# Patient Record
Sex: FEMALE | Race: WHITE | Employment: UNEMPLOYED | URBAN - NONMETROPOLITAN AREA
[De-identification: names, ages, dates, MRNs, and addresses within clinical notes are randomized per-mention and may not be internally consistent; named-entity substitution may affect disease eponyms.]

---
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
## 2022-12-05 ENCOUNTER — OFFICE VISIT (OUTPATIENT)
Dept: FAMILY MEDICINE CLINIC | Age: 22
End: 2022-12-05
Payer: COMMERCIAL

## 2022-12-05 VITALS
HEART RATE: 82 BPM | SYSTOLIC BLOOD PRESSURE: 126 MMHG | HEIGHT: 72 IN | BODY MASS INDEX: 21.26 KG/M2 | WEIGHT: 157 LBS | DIASTOLIC BLOOD PRESSURE: 82 MMHG | OXYGEN SATURATION: 99 % | TEMPERATURE: 98.1 F

## 2022-12-05 DIAGNOSIS — H10.021 PINK EYE DISEASE OF RIGHT EYE: Primary | ICD-10-CM

## 2022-12-05 PROCEDURE — G8427 DOCREV CUR MEDS BY ELIG CLIN: HCPCS | Performed by: NURSE PRACTITIONER

## 2022-12-05 PROCEDURE — G8484 FLU IMMUNIZE NO ADMIN: HCPCS | Performed by: NURSE PRACTITIONER

## 2022-12-05 PROCEDURE — 4004F PT TOBACCO SCREEN RCVD TLK: CPT | Performed by: NURSE PRACTITIONER

## 2022-12-05 PROCEDURE — G8420 CALC BMI NORM PARAMETERS: HCPCS | Performed by: NURSE PRACTITIONER

## 2022-12-05 PROCEDURE — 99203 OFFICE O/P NEW LOW 30 MIN: CPT | Performed by: NURSE PRACTITIONER

## 2022-12-05 RX ORDER — POLYMYXIN B SULFATE AND TRIMETHOPRIM 1; 10000 MG/ML; [USP'U]/ML
1 SOLUTION OPHTHALMIC EVERY 6 HOURS
Qty: 10 ML | Refills: 0 | Status: SHIPPED | OUTPATIENT
Start: 2022-12-05 | End: 2022-12-15

## 2022-12-05 ASSESSMENT — ENCOUNTER SYMPTOMS
ABDOMINAL PAIN: 0
COUGH: 0
EYE REDNESS: 1
RHINORRHEA: 0
SHORTNESS OF BREATH: 0
RESPIRATORY NEGATIVE: 1
DOUBLE VISION: 0
VOMITING: 0
PHOTOPHOBIA: 0
NAUSEA: 0
SORE THROAT: 0
EYE DISCHARGE: 1
DIARRHEA: 0

## 2022-12-05 NOTE — PROGRESS NOTES
Rosa Benedict (:  2000) is a 23 y.o. female,New patient, here for evaluation of the following chief complaint(s): Conjunctivitis         ASSESSMENT/PLAN:  1. Pink eye disease of right eye  -     trimethoprim-polymyxin b (POLYTRIM) 77958-0.1 UNIT/ML-% ophthalmic solution; Place 1 drop into the right eye in the morning and 1 drop at noon and 1 drop in the evening and 1 drop before bedtime. Do all this for 10 days. , Disp-10 mL, R-0Normal    No follow-ups on file. Subjective   SUBJECTIVE/OBJECTIVE:  Conjunctivitis   The current episode started today. The onset was sudden. Associated symptoms include eye discharge and eye redness. Pertinent negatives include no decreased vision, no double vision, no photophobia, no abdominal pain, no diarrhea, no nausea, no vomiting, no congestion, no ear discharge, no rhinorrhea, no sore throat and no cough. Review of Systems   HENT:  Negative for congestion, ear discharge, rhinorrhea and sore throat. Eyes:  Positive for discharge and redness. Negative for double vision and photophobia. Respiratory: Negative. Negative for cough and shortness of breath. Gastrointestinal:  Negative for abdominal pain, diarrhea, nausea and vomiting. Objective   Physical Exam  Vitals reviewed. Constitutional:       Appearance: Normal appearance. She is not ill-appearing. HENT:      Head: Normocephalic and atraumatic. Right Ear: Tympanic membrane normal.      Left Ear: Tympanic membrane normal.      Mouth/Throat:      Mouth: Mucous membranes are moist.   Eyes:      General:         Right eye: Discharge present. Left eye: No discharge. Extraocular Movements: Extraocular movements intact. Comments: +discharge from right eye. Cardiovascular:      Rate and Rhythm: Regular rhythm. Heart sounds: Normal heart sounds. No murmur heard. No gallop. Pulmonary:      Effort: No respiratory distress. Breath sounds: Normal breath sounds. No wheezing. Musculoskeletal:      Cervical back: Normal range of motion and neck supple. Lymphadenopathy:      Cervical: No cervical adenopathy. Skin:     General: Skin is warm and dry. Neurological:      Mental Status: She is alert and oriented to person, place, and time. Psychiatric:         Thought Content: Thought content normal.         Judgment: Judgment normal.                An electronic signature was used to authenticate this note.     --Abdulkadir Taylor, APRFABRICIO - CNP

## 2022-12-05 NOTE — LETTER
[address] [address]  Phone: [phone]  Fax: [phone]    JUAN Garcia CNP        December 5, 2022     Patient: Anne Marie Vincent   YOB: 2000   Date of Visit: 12/5/2022       To Whom it May Concern:    Anne Marie Vincent was seen in my clinic on 12/5/2022. She may return to school on 12-6-2022 . If you have any questions or concerns, please don't hesitate to call.     Sincerely,         JUAN Garcia CNP

## 2023-07-14 ENCOUNTER — OFFICE VISIT (OUTPATIENT)
Dept: PRIMARY CARE CLINIC | Age: 23
End: 2023-07-14

## 2023-07-14 ENCOUNTER — HOSPITAL ENCOUNTER (OUTPATIENT)
Dept: GENERAL RADIOLOGY | Age: 23
Discharge: HOME | End: 2023-07-14
Payer: COMMERCIAL

## 2023-07-14 VITALS
HEART RATE: 62 BPM | HEIGHT: 72 IN | DIASTOLIC BLOOD PRESSURE: 80 MMHG | OXYGEN SATURATION: 100 % | SYSTOLIC BLOOD PRESSURE: 104 MMHG | TEMPERATURE: 98.4 F | BODY MASS INDEX: 22.48 KG/M2 | RESPIRATION RATE: 15 BRPM | WEIGHT: 166 LBS

## 2023-07-14 DIAGNOSIS — M25.572 ACUTE LEFT ANKLE PAIN: ICD-10-CM

## 2023-07-14 DIAGNOSIS — M25.572 ACUTE LEFT ANKLE PAIN: Primary | ICD-10-CM

## 2023-07-14 PROCEDURE — 73610 X-RAY EXAM OF ANKLE: CPT

## 2023-07-14 SDOH — ECONOMIC STABILITY: FOOD INSECURITY: WITHIN THE PAST 12 MONTHS, YOU WORRIED THAT YOUR FOOD WOULD RUN OUT BEFORE YOU GOT MONEY TO BUY MORE.: NEVER TRUE

## 2023-07-14 SDOH — ECONOMIC STABILITY: HOUSING INSECURITY
IN THE LAST 12 MONTHS, WAS THERE A TIME WHEN YOU DID NOT HAVE A STEADY PLACE TO SLEEP OR SLEPT IN A SHELTER (INCLUDING NOW)?: NO

## 2023-07-14 SDOH — ECONOMIC STABILITY: INCOME INSECURITY: HOW HARD IS IT FOR YOU TO PAY FOR THE VERY BASICS LIKE FOOD, HOUSING, MEDICAL CARE, AND HEATING?: NOT HARD AT ALL

## 2023-07-14 SDOH — ECONOMIC STABILITY: FOOD INSECURITY: WITHIN THE PAST 12 MONTHS, THE FOOD YOU BOUGHT JUST DIDN'T LAST AND YOU DIDN'T HAVE MONEY TO GET MORE.: NEVER TRUE

## 2023-07-14 ASSESSMENT — PATIENT HEALTH QUESTIONNAIRE - PHQ9
1. LITTLE INTEREST OR PLEASURE IN DOING THINGS: 0
SUM OF ALL RESPONSES TO PHQ9 QUESTIONS 1 & 2: 0
SUM OF ALL RESPONSES TO PHQ QUESTIONS 1-9: 0
SUM OF ALL RESPONSES TO PHQ QUESTIONS 1-9: 0
2. FEELING DOWN, DEPRESSED OR HOPELESS: 0
SUM OF ALL RESPONSES TO PHQ QUESTIONS 1-9: 0
SUM OF ALL RESPONSES TO PHQ QUESTIONS 1-9: 0

## 2023-07-14 NOTE — PROGRESS NOTES
[facility]  [address]  [address] [address]  Dept: [phone]  Dept Fax: [phone]    Derik Dorman  is a 23 y.o. female who presents today for her medical conditions/complaints as noted below. Derik Dorman is c/o of  left ankle swelling    Chief Complaint   Patient presents with    Leg Swelling     Left leg an ankle swollen for a couple days played a sport in the sand and that's the only thing she can think of that caused it does ache a little when ambulating        HPI:     Ankle Pain   The incident occurred 3 to 5 days ago. The incident occurred in the yard. The injury mechanism is unknown (was playing sand volleyball on Sunday, states swelling began on Tuesday). The pain is present in the left ankle. The quality of the pain is described as aching (states pain increases after changing positions from resting to ambulating). The pain is at a severity of 3/10. The pain is mild. The pain has been Constant since onset. Pertinent negatives include no inability to bear weight, loss of motion, loss of sensation, muscle weakness, numbness or tingling. She reports no foreign bodies present. The symptoms are aggravated by movement and weight bearing. She has tried elevation, ice, rest and NSAIDs for the symptoms. The treatment provided mild relief. History reviewed. No pertinent past medical history. History reviewed. No pertinent surgical history. History reviewed. No pertinent family history. Social History     Tobacco Use    Smoking status: Never    Smokeless tobacco: Never   Substance Use Topics    Alcohol use: No      Prior to Visit Medications    Not on File     No Known Allergies    Subjective:      Review of Systems   Constitutional:  Positive for activity change (states has been elevating when possible). Negative for fever.    Musculoskeletal:  Positive for

## 2023-07-16 ENCOUNTER — OFFICE VISIT (OUTPATIENT)
Dept: PRIMARY CARE CLINIC | Age: 23
End: 2023-07-16

## 2023-07-16 VITALS
BODY MASS INDEX: 22.4 KG/M2 | SYSTOLIC BLOOD PRESSURE: 122 MMHG | TEMPERATURE: 98.5 F | HEIGHT: 72 IN | OXYGEN SATURATION: 99 % | WEIGHT: 165.4 LBS | HEART RATE: 89 BPM | DIASTOLIC BLOOD PRESSURE: 76 MMHG

## 2023-07-16 DIAGNOSIS — L03.116 CELLULITIS OF LEFT ANTERIOR LOWER LEG: Primary | ICD-10-CM

## 2023-07-16 RX ORDER — SULFAMETHOXAZOLE AND TRIMETHOPRIM 800; 160 MG/1; MG/1
1 TABLET ORAL 2 TIMES DAILY
Qty: 20 TABLET | Refills: 0 | Status: SHIPPED | OUTPATIENT
Start: 2023-07-16 | End: 2023-07-26

## 2023-07-16 ASSESSMENT — ENCOUNTER SYMPTOMS: RESPIRATORY NEGATIVE: 1
